# Patient Record
Sex: MALE | Race: WHITE | Employment: FULL TIME | URBAN - METROPOLITAN AREA
[De-identification: names, ages, dates, MRNs, and addresses within clinical notes are randomized per-mention and may not be internally consistent; named-entity substitution may affect disease eponyms.]

---

## 2017-03-04 ENCOUNTER — HOSPITAL ENCOUNTER (EMERGENCY)
Age: 57
Discharge: HOME | End: 2017-03-05
Attending: EMERGENCY MEDICINE
Payer: SELF-PAY

## 2017-03-04 VITALS
HEIGHT: 64 IN | SYSTOLIC BLOOD PRESSURE: 109 MMHG | OXYGEN SATURATION: 99 % | TEMPERATURE: 97.4 F | DIASTOLIC BLOOD PRESSURE: 55 MMHG | BODY MASS INDEX: 19.81 KG/M2 | RESPIRATION RATE: 20 BRPM | HEART RATE: 87 BPM | WEIGHT: 116 LBS

## 2017-03-04 DIAGNOSIS — S49.92XA SHOULDER INJURY, LEFT, INITIAL ENCOUNTER: Primary | ICD-10-CM

## 2017-03-04 PROCEDURE — 99283 EMERGENCY DEPT VISIT LOW MDM: CPT

## 2017-03-04 RX ORDER — IBUPROFEN 600 MG/1
600 TABLET ORAL
Status: COMPLETED | OUTPATIENT
Start: 2017-03-04 | End: 2017-03-05

## 2017-03-04 RX ORDER — TRAMADOL HYDROCHLORIDE 50 MG/1
50 TABLET ORAL
Status: COMPLETED | OUTPATIENT
Start: 2017-03-04 | End: 2017-03-05

## 2017-03-05 ENCOUNTER — APPOINTMENT (OUTPATIENT)
Dept: GENERAL RADIOLOGY | Age: 57
End: 2017-03-05
Attending: PHYSICIAN ASSISTANT
Payer: SELF-PAY

## 2017-03-05 PROCEDURE — 73030 X-RAY EXAM OF SHOULDER: CPT

## 2017-03-05 PROCEDURE — 77030036550 HC SLNG ARM PCH S2SG -A

## 2017-03-05 PROCEDURE — 74011250637 HC RX REV CODE- 250/637: Performed by: PHYSICIAN ASSISTANT

## 2017-03-05 RX ORDER — IBUPROFEN 600 MG/1
600 TABLET ORAL
Qty: 15 TAB | Refills: 0 | Status: SHIPPED | OUTPATIENT
Start: 2017-03-05

## 2017-03-05 RX ORDER — TRAMADOL HYDROCHLORIDE 50 MG/1
50 TABLET ORAL
Qty: 12 TAB | Refills: 0 | Status: SHIPPED | OUTPATIENT
Start: 2017-03-05

## 2017-03-05 RX ADMIN — IBUPROFEN 600 MG: 600 TABLET, FILM COATED ORAL at 00:00

## 2017-03-05 RX ADMIN — TRAMADOL HYDROCHLORIDE 50 MG: 50 TABLET, FILM COATED ORAL at 00:00

## 2017-03-05 NOTE — ED PROVIDER NOTES
HPI Comments: 60year old male presenting to the ED for left shoulder pain. Pt reports that about one year ago had a fall out of bed onto a hard floor and injured the LEFT shoulder. Since then has had intermittent pain with activity. Yesterday pt was working and over used and over extended the shoulder, has since had more constant, severe pain, aching in nature. Pt reports that pain is localized to the anterior shoulder, worsened with extension of the arm posteriorly. No numbness/weakness, fever, or swelling. No CP or SOB. Pt taking Tylenol q4 hours at home with no relief. PMHx: denies  Social: . Patient is a 60 y.o. male presenting with arm pain. The history is provided by the patient and a friend. The history is limited by a language barrier. A  was used (friend translating at bedside). Arm Pain    Pertinent negatives include no numbness. No past medical history on file. No past surgical history on file. No family history on file. Social History     Social History    Marital status: N/A     Spouse name: N/A    Number of children: N/A    Years of education: N/A     Occupational History    Not on file. Social History Main Topics    Smoking status: Not on file    Smokeless tobacco: Not on file    Alcohol use Not on file    Drug use: Not on file    Sexual activity: Not on file     Other Topics Concern    Not on file     Social History Narrative         ALLERGIES: [place]    Review of Systems   Constitutional: Negative for fever. Respiratory: Negative for shortness of breath. Cardiovascular: Negative for chest pain. Gastrointestinal: Negative for diarrhea and vomiting. Musculoskeletal:        + left shoulder pain   Skin: Negative for wound. Neurological: Negative for weakness and numbness. Psychiatric/Behavioral: Negative for behavioral problems. All other systems reviewed and are negative.       Vitals:    03/04/17 2337   BP: 109/55   Pulse: 87   Resp: 20   Temp: 97.4 °F (36.3 °C)   SpO2: 99%   Weight: 52.6 kg (116 lb)   Height: 5' 4\" (1.626 m)            Physical Exam   Constitutional: He is oriented to person, place, and time. He appears well-developed and well-nourished. No distress.  male, no distress   HENT:   Head: Normocephalic and atraumatic. Right Ear: External ear normal.   Left Ear: External ear normal.   Eyes: Conjunctivae are normal. No scleral icterus. Neck: Neck supple. No tracheal deviation present. Cardiovascular: Normal rate, regular rhythm and normal heart sounds. Exam reveals no gallop and no friction rub. No murmur heard. Pulmonary/Chest: Effort normal and breath sounds normal. No stridor. No respiratory distress. He has no wheezes. Abdominal: Soft. He exhibits no distension. Musculoskeletal: Normal range of motion. He exhibits no edema or deformity. LEFT SHOULDER: unremarkable to inspection. Symmetric when compared to the right shoulder. Pain reproduced with passive extension of the shoulder. Pt able to actively range the shoulder but with increased pain. No edema or vascular congestion. 2+ radial pulse, 5/5  strength. Neurological: He is alert and oriented to person, place, and time. Skin: Skin is warm and dry. Psychiatric: He has a normal mood and affect. His behavior is normal.   Nursing note and vitals reviewed. MDM  Number of Diagnoses or Management Options  Diagnosis management comments: 60year old male presenting to the ED for left shoulder pain, yesterday exacerbated a chronic injury. No signs of septic arthritis. Good ROM on exam, with increased pain. Arthritis on XR without fracture, separation, or dislocation. Discussed with pt use of NSAID/pain medicine PRN, sling for comfort for a few days with ROM exercises (risks of frozen shoulder discussed), ortho f/u for further evaluation.        Amount and/or Complexity of Data Reviewed  Tests in the radiology section of CPT®: ordered and reviewed  Discuss the patient with other providers: yes (Dr. Shahab Siddiqui, ED attending)      ED Course       Procedures

## 2017-03-05 NOTE — DISCHARGE INSTRUCTIONS
Learning About RICE (Rest, Ice, Compression, and Elevation)  What is RICE? RICE is a way to care for an injury. RICE helps relieve pain and swelling. It may also help with healing and flexibility. RICE stands for:  · Rest and protect the injured or sore area. · Ice or a cold pack used as soon as possible. · Compression, or wrapping the injured or sore area with an elastic bandage. · Elevation (propping up) the injured or sore area. How do you do RICE? You can use RICE for home treatment when you have general aches and pains or after an injury or surgery. Rest  · Do not put weight on the injury for at least 24 to 48 hours. · Use crutches for a badly sprained knee or ankle. · Support a sprained wrist, elbow, or shoulder with a sling. Ice  · Put ice or a cold pack on the injury right away to reduce pain and swelling. Frozen vegetables will also work as an ice pack. Put a thin cloth between the ice or cold pack and your skin. The cloth protects the injured area from getting too cold. · Use ice for 10 to 15 minutes at a time for the first 48 to 72 hours. Compression  · Use compression for sprains, strains, and surgeries of the arms and legs. · Wrap the injured area with an elastic bandage or compression sleeve to reduce swelling. · Don't wrap it too tightly. If the area below it feels numb, tingles, or feels cool, loosen the wrap. Elevation  · Use elevation for areas of the body that can be propped up, such as arms and legs. · Prop up the injured area on pillows whenever you use ice. Keep it propped up anytime you sit or lie down. · Try to keep the injured area at or above the level of your heart. This will help reduce swelling and bruising. Where can you learn more? Go to http://jeffery-anuradha.info/. Enter M687 in the search box to learn more about \"Learning About RICE (Rest, Ice, Compression, and Elevation). \"  Current as of: May 23, 2016  Content Version: 11.1  © 3149-7237 [employer]. Care instructions adapted under license by [employer] (which disclaims liability or warranty for this information). If you have questions about a medical condition or this instruction, always ask your healthcare professional. [address] any warranty or liability for your use of this information. Shoulder Stretches: Exercises  Your Care Instructions  Here are some examples of exercises for your shoulder. Start each exercise slowly. Ease off the exercise if you start to have pain. Your doctor or physical therapist will tell you when you can start these exercises and which ones will work best for you. How to do the exercises  Note: These exercises should cause you to feel a gentle stretch, but no pain. Shoulder stretch    1.  a doorway and place one arm against the door frame. Your elbow should be a little higher than your shoulder. 2. Relax your shoulders as you lean forward, allowing your chest and shoulder muscles to stretch. You can also turn your body slightly away from your arm to stretch the muscles even more. 3. Hold for 15 to 30 seconds. 4. Repeat 2 to 4 times with each arm. Shoulder and chest stretch    Shoulder and chest stretch  1. While sitting, relax your upper body so you slump slightly in your chair. 2. As you breathe in, straighten your back and open your arms out to the sides. 3. Gently pull your shoulder blades back and downward. 4. Hold for 15 to 30 seconds as your breathe normally. 5. Repeat 2 to 4 times. Overhead stretch    1. Reach up over your head with both arms. 2. Hold for 15 to 30 seconds. 3. Repeat 2 to 4 times. Follow-up care is a key part of your treatment and safety. Be sure to make and go to all appointments, and call your doctor if you are having problems. It's also a good idea to know your test results and keep a list of the medicines you take. Where can you learn more?   Go to http://jeffery-anuradha.info/. Enter S254 in the search box to learn more about \"Shoulder Stretches: Exercises. \"  Current as of: May 23, 2016  Content Version: 11.1  © 3013-4045 [employer]. Care instructions adapted under license by [employer] (which disclaims liability or warranty for this information). If you have questions about a medical condition or this instruction, always ask your healthcare professional. [address] any warranty or liability for your use of this information. Shoulder Pain: Care Instructions  Your Care Instructions    You can hurt your shoulder by using it too much during an activity, such as fishing or baseball. It can also happen as part of the everyday wear and tear of getting older. Shoulder injuries can be slow to heal, but your shoulder should get better with time. Your doctor may recommend a sling to rest your shoulder. If you have injured your shoulder, you may need testing and treatment. Follow-up care is a key part of your treatment and safety. Be sure to make and go to all appointments, and call your doctor if you are having problems. It's also a good idea to know your test results and keep a list of the medicines you take. How can you care for yourself at home? · Take pain medicines exactly as directed. ¨ If the doctor gave you a prescription medicine for pain, take it as prescribed. ¨ If you are not taking a prescription pain medicine, ask your doctor if you can take an over-the-counter medicine. ¨ Do not take two or more pain medicines at the same time unless the doctor told you to. Many pain medicines contain acetaminophen, which is Tylenol. Too much acetaminophen (Tylenol) can be harmful. · If your doctor recommends that you wear a sling, use it as directed. Do not take it off before your doctor tells you to. · Put ice or a cold pack on the sore area for 10 to 20 minutes at a time.  Put a thin cloth between the ice and your skin. · If there is no swelling, you can put moist heat, a heating pad, or a warm cloth on your shoulder. Some doctors suggest alternating between hot and cold. · Rest your shoulder for a few days. If your doctor recommends it, you can then begin gentle exercise of the shoulder, but do not lift anything heavy. When should you call for help? Call 911 anytime you think you may need emergency care. For example, call if:  · You have chest pain or pressure. This may occur with:  ¨ Sweating. ¨ Shortness of breath. ¨ Nausea or vomiting. ¨ Pain that spreads from the chest to the neck, jaw, or one or both shoulders or arms. ¨ Dizziness or lightheadedness. ¨ A fast or uneven pulse. After calling 911, chew 1 adult-strength aspirin. Wait for an ambulance. Do not try to drive yourself. · Your arm or hand is cool or pale or changes color. Call your doctor now or seek immediate medical care if:  · You have signs of infection, such as:  ¨ Increased pain, swelling, warmth, or redness in your shoulder. ¨ Red streaks leading from a place on your shoulder. ¨ Pus draining from an area of your shoulder. ¨ Swollen lymph nodes in your neck, armpits, or groin. ¨ A fever. Watch closely for changes in your health, and be sure to contact your doctor if:  · You cannot use your shoulder. · Your shoulder does not get better as expected. Where can you learn more? Go to http://jeffery-anuradha.info/. Enter L833 in the search box to learn more about \"Shoulder Pain: Care Instructions. \"  Current as of: May 23, 2016  Content Version: 11.1  © 4621-0789 [employer]. Care instructions adapted under license by [employer] (which disclaims liability or warranty for this information).  If you have questions about a medical condition or this instruction, always ask your healthcare professional. [address] any warranty or liability for your use of this information.

## 2017-03-05 NOTE — ED TRIAGE NOTES
Patient fell approx 1 year ago, and hurt his left shoulder. Was working today and aggravated it again.